# Patient Record
Sex: FEMALE | Race: WHITE | Employment: STUDENT | URBAN - METROPOLITAN AREA
[De-identification: names, ages, dates, MRNs, and addresses within clinical notes are randomized per-mention and may not be internally consistent; named-entity substitution may affect disease eponyms.]

---
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
## 2023-09-05 DIAGNOSIS — Z30.011 ENCOUNTER FOR INITIAL PRESCRIPTION OF CONTRACEPTIVE PILLS: ICD-10-CM

## 2023-09-05 NOTE — TELEPHONE ENCOUNTER
Gynecology Medication Protocol [id number]/05/2023 10:31 AM    PASS-PENDING LAST PAP WNL--VIA MANUAL LOOKUP    Physical or Pelvic/Breast in past 12 or next 3 mos--VIA MANUAL LOOKU

## 2023-09-06 RX ORDER — NORETHINDRONE ACETATE AND ETHINYL ESTRADIOL 1MG-20(21)
1 KIT ORAL DAILY
Qty: 84 TABLET | Refills: 0 | Status: SHIPPED | OUTPATIENT
Start: 2023-09-06 | End: 2024-09-05

## 2023-09-06 NOTE — TELEPHONE ENCOUNTER
Advised patient of Doctor's note below. Patient verbalized understanding. Offered to schedule appt  - pt unable to at this time, stated will call office back to schedule. No further questions at this time.

## 2023-09-06 NOTE — TELEPHONE ENCOUNTER
To be filled at: None [Patient requested: [facility] [address] and spoke with pt - confirmed pharmacy:  [facility] - [address], [facility]    Order(s) pending, please review. Thank you.

## 2023-09-06 NOTE — TELEPHONE ENCOUNTER
Script sent for 3 months, but she is due to see me. Hasn't seen me in a year. Please schedule before any more refills.

## (undated) NOTE — LETTER
Date: 3/25/2020    Patient Name: Aaron Toth          To Whom it may concern: This letter has been written at the patient's request.     This patient has tested negative for COVID-19.          Sincerely,    Cory Enciso, DO

## (undated) NOTE — LETTER
Date: 8/27/2018    Patient Name: Alexy Rico          To Whom it may concern: This letter has been written at the patient's request. The above patient was seen at the [facility] for treatment of a medical condition.     This patient should

## (undated) NOTE — LETTER
Date: 4/19/2019    Patient Name: Susi Payne          To Whom it may concern: This letter has been written at the patient's request. The above patient was seen at the [facility] for treatment of a medical condition.     This patient should

## (undated) NOTE — LETTER
Date: 5/3/2019    Patient Name: Melissa Mckinney          To Whom it may concern: This letter has been written at the patient's request. The above patient was seen at the [facility] for treatment of a medical condition.     Please allow Patient

## (undated) NOTE — LETTER
Date: 9/14/2017    Patient Name: Stephanie James          To Whom it may concern: This letter has been written at the patient's request. The above patient was seen at the [facility] for treatment of a medical condition.     The patient may retu

## (undated) NOTE — LETTER
Date: 5/16/2019    Patient Name: Melissa Mckinney          To Whom it may concern: This letter has been written at the patient's request. The above patient was seen at the [facility] for treatment of a medical condition.     This patient should

## (undated) NOTE — LETTER
Date: 4/15/2019    Patient Name: Daniel Barton          To Whom it may concern: This letter has been written at the patient's request. The above patient was seen at the [facility] for treatment of a medical condition.     This patient should

## (undated) NOTE — LETTER
Date: 5/6/2019    Patient Name: Ida Brown          To Whom it may concern: This letter has been written at the patient's request. The above patient was seen at the [facility] for treatment of a medical condition.     This patient should b

## (undated) NOTE — Clinical Note
Date: 2/7/2017    Patient Name: Stephanie James          To Whom it may concern: This letter has been written at the patient's request. The above patient was seen at the [facility] for treatment of a medical condition.     This patient should b

## (undated) NOTE — LETTER
Date: 4/11/2019    Patient Name: Gilma Diggs          To Whom it may concern: This letter has been written at the patient's request. The above patient was seen at the [facility] for treatment of a medical condition.     This patient should

## (undated) NOTE — LETTER
Date: 5/1/2018    Patient Name: Mani Tidwell          To Whom it may concern: This letter has been written at the patient's request. The above patient was seen at the [facility] for treatment of a medical condition.     This patient should b

## (undated) NOTE — LETTER
Date: 2/11/2019    Patient Name: Mildred oPp          To Whom it may concern: This letter has been written at the patient's request. The above patient was seen at the [facility] for treatment of a medical condition.     This patient should

## (undated) NOTE — LETTER
Date: 8/15/2019    Patient Name: Anastacio Harris          To Whom it may concern: This letter has been written at the patient's request. The above patient was seen at the [facility] for treatment of a medical condition.     This patient should

## (undated) NOTE — ED AVS SNAPSHOT
Stevie    MRN: [id number]    Department:  [facility] Emergency Department   Date of Visit:  3/18/2020           Disclosure     Insurance plans vary and the physician(s) referred by the ER may not be covered by your plan.  Please contact your in tell this physician (or your personal doctor if your instructions are to return to your personal doctor) about any new or lasting problems. The primary care or specialist physician will see patients referred from the [facility] Emergency Department.  Kiko Olivares

## (undated) NOTE — LETTER
Date: 2/4/2020    Patient Name: Jose Woods          To Whom it may concern: This letter has been written at the patient's request. The above patient was seen at the [facility] for treatment of a medical condition.     This patient should b

## (undated) NOTE — LETTER
Date: 8/30/2018    Patient Name: Mani Tidwell          To Whom it may concern: The above patient was seen at the [facility] for treatment of a medical condition.     This patient should be excused from volleyball and gym for sports involving

## (undated) NOTE — LETTER
Date: 8/27/2019    Patient Name: Dereje Sam          To Whom it may concern: This letter has been written at the patient's request. The above patient was seen at the [facility] for treatment of a medical condition.     This patient should